# Patient Record
Sex: MALE | Race: WHITE | NOT HISPANIC OR LATINO | Employment: FULL TIME | URBAN - METROPOLITAN AREA
[De-identification: names, ages, dates, MRNs, and addresses within clinical notes are randomized per-mention and may not be internally consistent; named-entity substitution may affect disease eponyms.]

---

## 2018-01-13 NOTE — PROGRESS NOTES
Assessment    1  Encounter to establish care with new doctor (V65 8) (Z71 89)   2  Neck pain on left side (723 1) (M54 2)   3  Chronic low back pain (724 2,338 29) (M54 5,G89 29)   4  History of Primary Repair Of Knee Ligament Cruciate Anterior   5  Family history of Arthritis of back : Sister   10  No pertinent family history : Mother, Father   7  Alcohol ingestion, 1-4 drinks/week (V69 8) (Z72 89)   8  Does not use illicit drugs (C95 39) (Z78 9)    Plan  Chronic low back pain    · (1) LYME ANTIBODY PROFILE W/REFLEX TO WESTERN BLOT; Status:Active; Requested for:01Aug2016;   Chronic low back pain, Neck pain on left side    · * XR SPINE CERVICAL COMPLETE 4 OR 5 VIEW; Status:Active - Retrospective By  Protocol Authorization; Requested for:01Aug2016;    · * XR SPINE LUMBAR MINIMUM 4 VIEWS; Status:Active - Retrospective By Protocol  Authorization; Requested for:01Aug2016; Health Maintenance    · (1) CBC/PLT/DIFF; Status:Active; Requested for:01Aug2016;    · (1) COMPREHENSIVE METABOLIC PANEL; Status:Active; Requested for:01Aug2016;    · (1) LIPID PANEL FASTING W DIRECT LDL REFLEX; Status:Active; Requested  for:01Aug2016;    · (1) TSH WITH FT4 REFLEX; Status:Active; Requested for:01Aug2016;    · (1) URINALYSIS w URINE C/S REFLEX (will reflex a microscopy if leukocytes, occult  blood, or nitrites are not within normal limits); Status:Active; Requested for:01Aug2016;    · (1) VITAMIN D 25-HYDROXY; Status:Active; Requested for:01Aug2016;     Discussion/Summary    Lab Studies ordered  Advised him to watch his salt intake and lose weight to help with his diastolic hypertension  He also is going to consult with advise to dermatology for his several moles on his back  I believe his back pain is due to years of accumulation of degenerative joint disease as well as mild scoliosis which could be compensatory to his leg length discrepancy  X-rays will be ordered for his neck and for his lower back   Stretching exercises showed the patient 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  He does not wish to go on muscle relaxer  I will see him back in a year if all is well  Chief Complaint  ESTABLISH CARE      History of Present Illness  HPI: pt here to establish care      Review of Systems    Constitutional: no fever, not feeling poorly, no chills and not feeling tired  Eyes: no eye pain and no eyesight problems  ENT: no earache and no sore throat  Cardiovascular: no chest pain, no intermittent leg claudication and no extremity edema  Respiratory: no shortness of breath and no cough  Gastrointestinal: no abdominal pain, no nausea, no vomiting and no diarrhea  Genitourinary: no urinary hesitancy and no nocturia  Musculoskeletal: arthralgias, but as noted in HPI  Integumentary: a rash  Neurological: numbness and tingling, but as noted in HPI, no headache, no confusion, no limb weakness and no difficulty walking  Psychiatric: not suicidal, no anxiety and no depression  Endocrine: no muscle weakness  Hematologic/Lymphatic: no tendency for easy bleeding  Active Problems    1  Chronic low back pain (724 2,338 29) (M54 5,G89 29)   2  Neck pain on left side (723 1) (M54 2)    Surgical History    · History of Knee Surgery Right   · History of Primary Repair Of Knee Ligament Cruciate Anterior    Family History  Mother    · No pertinent family history  Father    · No pertinent family history  Sister    · Family history of Arthritis of back    Social History    · Alcohol ingestion, 1-4 drinks/week (V69 8) (Z72 89)   · Does not use illicit drugs (C31 53) (Z78 9)    Vitals   Recorded: 01LMQ2260 30:03PE   Systolic 163   Diastolic 92   Heart Rate 56   O2 Saturation 95   Height 5 ft 9 in   Weight 197 lb 8 0 oz   BMI Calculated 29 17   BSA Calculated 2 05     Physical Exam    Constitutional   General appearance: No acute distress, well appearing and well nourished      Head and Face   Head and face: Normal     Eyes Conjunctiva and lids: No erythema, swelling or discharge  Ears, Nose, Mouth, and Throat   Hearing: Normal     Neck   Neck: Supple, symmetric, trachea midline, no masses  Thyroid: Normal, no thyromegaly  Pulmonary   Respiratory effort: No increased work of breathing or signs of respiratory distress  Auscultation of lungs: Clear to auscultation  Cardiovascular   Auscultation of heart: Normal rate and rhythm, normal S1 and S2, no murmurs  Carotid pulses: 2+ bilaterally  Abdominal aorta: Normal     Pedal pulses: 2+ bilaterally  Examination of extremities for edema and/or varicosities: Normal     Abdomen   Abdomen: Non-tender, no masses  Liver and spleen: No hepatomegaly or splenomegaly  Lymphatic   Palpation of lymph nodes in neck: No lymphadenopathy  Palpation of lymph nodes in axillae: No lymphadenopathy  Musculoskeletal   Gait and station: Normal     Inspection/palpation of digits and nails: Normal without clubbing or cyanosis  Inspection/palpation of joints, bones, and muscles: Abnormal   Mild thoracic-lumbar scoliosis  Range of motion: Normal   Point tenderness left hip  Stability: Normal     Muscle strength/tone: Normal     Skin   Skin and subcutaneous tissue: Abnormal   Solar keratosis  Neurologic   Cranial nerves: Cranial nerves 2-12 intact  Cortical function: Normal mental status  Psychiatric   Judgment and insight: Normal     Orientation to person, place and time: Normal     Recent and remote memory: Intact  Mood and affect: Normal        Future Appointments    Date/Time Provider Specialty Site   08/01/2017 08:00 AM HERMELINDA Patel   Internal Medicine [facility] IM     Signatures   Electronically signed by : HERMELINDA Miller ; Aug  1 2016 12:22PM EST                       (Author)

## 2020-03-11 ENCOUNTER — TELEPHONE (OUTPATIENT)
Dept: FAMILY MEDICINE CLINIC | Facility: CLINIC | Age: 45
End: 2020-03-11

## 2020-03-11 NOTE — TELEPHONE ENCOUNTER
----- Message from Kevin Nice MA sent at 3/11/2020  4:02 PM EDT -----  This pt is in need for an appointment  This pt have never been seen in out department for the last 3 years nor has an upcomming appointment  Please call to make these appointments  After 3 attempts please send back to [facility] to send letter  If found that the pt has moved or est care somewhere else please provide new address or PCP name to [facility]      Directions to turn this in to Telephone Call:  Click Telephone Call button In task  When asked if you want to copy the message text to the encounter note, select yes  For all attemps make to contact the pt please make sure you are using the contact info tab in the Telephone Call encounter to document

## 2020-03-11 NOTE — TELEPHONE ENCOUNTER
I spoke with the patient about this  He states that he is not seeing dr Singer Payment anymore  Pt is being see out side of network   Pt refused to name new PCP

## 2020-03-16 NOTE — TELEPHONE ENCOUNTER
03/15/20 8:26 PM     Thank you for your request  Your request has been received, reviewed, and the patient chart updated  The PCP has successfully been removed with a patient attribution note  This message will now be completed      Thank you  Ken Townsend

## 2020-10-30 ENCOUNTER — TELEPHONE (OUTPATIENT)
Dept: NEUROLOGY | Facility: CLINIC | Age: 45
End: 2020-10-30